# Patient Record
Sex: FEMALE | Race: WHITE | NOT HISPANIC OR LATINO | Employment: UNEMPLOYED | RURAL
[De-identification: names, ages, dates, MRNs, and addresses within clinical notes are randomized per-mention and may not be internally consistent; named-entity substitution may affect disease eponyms.]

---
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
## 2025-01-07 ENCOUNTER — TELEPHONE (OUTPATIENT)
Dept: FAMILY MEDICINE CLINIC | Facility: CLINIC | Age: 20
End: 2025-01-07
Payer: COMMERCIAL

## 2025-01-07 NOTE — TELEPHONE ENCOUNTER
HUB TO RELAY:     Attempted to call patient and reschedule appointment on 01/06/2025. Unable to reach patient. Hub okay to reschedule.

## 2025-01-29 ENCOUNTER — OFFICE VISIT (OUTPATIENT)
Dept: FAMILY MEDICINE CLINIC | Facility: CLINIC | Age: 20
End: 2025-01-29
Payer: COMMERCIAL

## 2025-01-29 VITALS
HEART RATE: 96 BPM | TEMPERATURE: 98.6 F | RESPIRATION RATE: 18 BRPM | OXYGEN SATURATION: 100 % | HEIGHT: 66 IN | WEIGHT: 136 LBS | SYSTOLIC BLOOD PRESSURE: 110 MMHG | BODY MASS INDEX: 21.86 KG/M2 | DIASTOLIC BLOOD PRESSURE: 60 MMHG

## 2025-01-29 DIAGNOSIS — Z00.00 ENCOUNTER FOR WELL ADULT EXAM WITHOUT ABNORMAL FINDINGS: Primary | ICD-10-CM

## 2025-01-29 DIAGNOSIS — Z13.1 SCREENING FOR DIABETES MELLITUS: ICD-10-CM

## 2025-01-29 DIAGNOSIS — N92.1 MENORRHAGIA WITH IRREGULAR CYCLE: ICD-10-CM

## 2025-01-29 DIAGNOSIS — Z13.6 ENCOUNTER FOR LIPID SCREENING FOR CARDIOVASCULAR DISEASE: ICD-10-CM

## 2025-01-29 DIAGNOSIS — Z13.220 ENCOUNTER FOR LIPID SCREENING FOR CARDIOVASCULAR DISEASE: ICD-10-CM

## 2025-01-29 DIAGNOSIS — Z30.41 ENCOUNTER FOR SURVEILLANCE OF CONTRACEPTIVE PILLS: ICD-10-CM

## 2025-01-29 DIAGNOSIS — E55.9 VITAMIN D DEFICIENCY: ICD-10-CM

## 2025-01-29 DIAGNOSIS — R53.83 FATIGUE, UNSPECIFIED TYPE: ICD-10-CM

## 2025-01-29 PROCEDURE — 99385 PREV VISIT NEW AGE 18-39: CPT | Performed by: PHYSICIAN ASSISTANT

## 2025-01-29 PROCEDURE — 2014F MENTAL STATUS ASSESS: CPT | Performed by: PHYSICIAN ASSISTANT

## 2025-01-29 RX ORDER — DROSPIRENONE AND ETHINYL ESTRADIOL 0.03MG-3MG
1 KIT ORAL DAILY
Qty: 84 TABLET | Refills: 3 | Status: SHIPPED | OUTPATIENT
Start: 2025-01-29

## 2025-01-29 NOTE — PROGRESS NOTES
Eddie Power is a 19 y.o. female.     History of Present Illness   History of Present Illness  The patient presents to [facility] care, for annual physical and for evaluation of irregular periods. She is accompanied by her mother.    She has been experiencing irregular menstrual cycles.  She initiated her first menstrual cycle at the age of 12, characterized by severe cramping that resulted in school absenteeism. Her menstrual flow typically lasts for 5 days, and she occasionally resorts to Midol or Tylenol for pain management. She reports no history of sexual activity. She discontinued her birth control regimen last month to observe her body's natural response but did not experience a regular menstrual cycle this month. She has been on birth control for approximately 2 years, during which she would procure 3 packs at a time. She has not undergone any thyroid function tests. Prior to discontinuing birth control, she maintained a regular monthly menstrual cycle. She has previously tried a lower dose birth control, which resulted in breakthrough bleeding, but her current birth control has been effective in regulating her menstrual cycle. She also reports fatigue, which she attributes to her academic commitments.    She has undergone laser treatment for hirsutism.    Supplemental Information  She had her wisdom teeth removed in August 2022 without any complications. She maintains good dental and eye health.    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    FAMILY HISTORY  Her father has diabetes. Her grandmother has thyroid concerns.    MEDICATIONS  Discontinued: Birth control       The following portions of the patient's history were reviewed and updated as appropriate: allergies, current medications, past family history, past medical history, past social history, past surgical history, and problem list.    Review of Systems  As noted per HPI  "    Objective   Blood pressure 110/60, pulse 96, temperature 98.6 °F (37 °C), resp. rate 18, height 166.4 cm (65.5\"), weight 61.7 kg (136 lb), SpO2 100%. Body mass index is 22.29 kg/m².     Physical Exam  Vitals reviewed.   Constitutional:       Appearance: Normal appearance.   HENT:      Head: Normocephalic.      Right Ear: Tympanic membrane, ear canal and external ear normal.      Left Ear: Tympanic membrane, ear canal and external ear normal.      Nose: Nose normal.      Mouth/Throat:      Pharynx: No oropharyngeal exudate or posterior oropharyngeal erythema.   Eyes:      Conjunctiva/sclera: Conjunctivae normal.   Cardiovascular:      Rate and Rhythm: Normal rate and regular rhythm.   Pulmonary:      Effort: Pulmonary effort is normal.      Breath sounds: Normal breath sounds.   Neurological:      Mental Status: She is alert and oriented to person, place, and time.   Psychiatric:         Mood and Affect: Mood normal.         Behavior: Behavior normal.         Results      Assessment & Plan   Assessment & Plan  1. Irregular menstrual cycles.  Her symptoms do not align with a diagnosis of polycystic ovarian syndrome (PCOS). The possibility of endometriosis was discussed, but it is unlikely given the absence of debilitating symptoms. She was advised to consider alternative contraceptive methods such as implants or intrauterine devices (IUDs). She was also informed about the potential benefits of hormonal therapy in managing endometriosis. She was advised to undergo a Pap smear in May 2025. A prescription refill for her birth control pills was provided, with the recommendation to commence the new pack on the first day of her next menstrual cycle. A comprehensive blood workup will be conducted today to assess her hormone levels, including testosterone, and vitamin levels. She was encouraged to establish an account on [employer] to facilitate access to her lab results and communication with the healthcare team.    2. " Hirsutism.  She has experienced excess hair growth on the chin area and has undergone laser treatment. Blood work will be conducted to check hormone levels, including testosterone, to determine if there is a hormonal imbalance contributing to the condition. If testosterone levels are high, non-hormonal treatment strategies will be considered.    3. Health Maintenance.  She was advised to undergo a Pap smear in May 2025 to screen for cervical cancer risk. Blood work will also include screening for diabetes due to a family history of the condition.    PROCEDURE  The patient had her wisdom teeth removed in August 2022.     Diagnoses and all orders for this visit:    1. Encounter for well adult exam without abnormal findings (Primary)  -     CBC w AUTO Differential  -     Comprehensive metabolic panel  -     Lipid Panel  -     Hemoglobin A1c  -     TSH  -     T4, free  -     Iron Profile  -     Vitamin B12  -     Folate  -     Vitamin D 25 hydroxy  -     FSH & LH  -     Estrogens, Total  -     Testosterone    2. Screening for diabetes mellitus  -     Hemoglobin A1c    3. Encounter for lipid screening for cardiovascular disease  -     Lipid Panel    4. Menorrhagia with irregular cycle  -     FSH & LH  -     Estrogens, Total  -     Testosterone    5. Fatigue, unspecified type  -     CBC w AUTO Differential  -     Comprehensive metabolic panel  -     TSH  -     T4, free  -     Iron Profile  -     Vitamin B12  -     Folate    6. Vitamin D deficiency  -     Vitamin D 25 hydroxy    7. Encounter for surveillance of contraceptive pills  -     drospirenone-ethinyl estradiol (Laura 28) 3-0.03 MG per tablet; Take 1 tablet by mouth Daily.  Dispense: 84 tablet; Refill: 3    Counseling was given to patient for the following topics: instructions for management, risk factor reductions, patient and family education, and impressions . Total time of the encounter was 15 minutes and 7.5 minutes was spent counseling.             Patient or  patient representative verbalized consent for the use of Ambient Listening during the visit with  CHERYL Dale for chart documentation. 1/29/2025  14:02 EST

## 2025-02-01 LAB
25(OH)D3+25(OH)D2 SERPL-MCNC: 9 NG/ML (ref 30–100)
ALBUMIN SERPL-MCNC: 4.6 G/DL (ref 4–5)
ALP SERPL-CCNC: 61 IU/L (ref 42–106)
ALT SERPL-CCNC: 17 IU/L (ref 0–32)
AST SERPL-CCNC: 15 IU/L (ref 0–40)
BASOPHILS # BLD AUTO: 0 X10E3/UL (ref 0–0.2)
BASOPHILS NFR BLD AUTO: 1 %
BILIRUB SERPL-MCNC: 0.4 MG/DL (ref 0–1.2)
BUN SERPL-MCNC: 8 MG/DL (ref 6–20)
BUN/CREAT SERPL: 19 (ref 9–23)
CALCIUM SERPL-MCNC: 9.5 MG/DL (ref 8.7–10.2)
CHLORIDE SERPL-SCNC: 106 MMOL/L (ref 96–106)
CHOLEST SERPL-MCNC: 148 MG/DL (ref 100–199)
CO2 SERPL-SCNC: 21 MMOL/L (ref 20–29)
CREAT SERPL-MCNC: 0.43 MG/DL (ref 0.57–1)
EGFRCR SERPLBLD CKD-EPI 2021: 143 ML/MIN/1.73
EOSINOPHIL # BLD AUTO: 0.3 X10E3/UL (ref 0–0.4)
EOSINOPHIL NFR BLD AUTO: 4 %
ERYTHROCYTE [DISTWIDTH] IN BLOOD BY AUTOMATED COUNT: 11.7 % (ref 11.7–15.4)
ESTROGEN SERPL-MCNC: 270 PG/ML
FOLATE SERPL-MCNC: 5.1 NG/ML
FSH SERPL-ACNC: 4.8 MIU/ML
GLOBULIN SER CALC-MCNC: 2.5 G/DL (ref 1.5–4.5)
GLUCOSE SERPL-MCNC: 82 MG/DL (ref 70–99)
HBA1C MFR BLD: 5.3 % (ref 4.8–5.6)
HCT VFR BLD AUTO: 37.6 % (ref 34–46.6)
HDLC SERPL-MCNC: 47 MG/DL
HGB BLD-MCNC: 12.6 G/DL (ref 11.1–15.9)
IMM GRANULOCYTES # BLD AUTO: 0 X10E3/UL (ref 0–0.1)
IMM GRANULOCYTES NFR BLD AUTO: 1 %
IRON SATN MFR SERPL: 38 % (ref 15–55)
IRON SERPL-MCNC: 137 UG/DL (ref 27–159)
LDLC SERPL CALC-MCNC: 68 MG/DL (ref 0–99)
LH SERPL-ACNC: 10.9 MIU/ML
LYMPHOCYTES # BLD AUTO: 2.1 X10E3/UL (ref 0.7–3.1)
LYMPHOCYTES NFR BLD AUTO: 33 %
MCH RBC QN AUTO: 29.9 PG (ref 26.6–33)
MCHC RBC AUTO-ENTMCNC: 33.5 G/DL (ref 31.5–35.7)
MCV RBC AUTO: 89 FL (ref 79–97)
MONOCYTES # BLD AUTO: 0.5 X10E3/UL (ref 0.1–0.9)
MONOCYTES NFR BLD AUTO: 8 %
NEUTROPHILS # BLD AUTO: 3.6 X10E3/UL (ref 1.4–7)
NEUTROPHILS NFR BLD AUTO: 53 %
PLATELET # BLD AUTO: 303 X10E3/UL (ref 150–450)
POTASSIUM SERPL-SCNC: 4.3 MMOL/L (ref 3.5–5.2)
PROT SERPL-MCNC: 7.1 G/DL (ref 6–8.5)
RBC # BLD AUTO: 4.22 X10E6/UL (ref 3.77–5.28)
SODIUM SERPL-SCNC: 140 MMOL/L (ref 134–144)
T4 FREE SERPL-MCNC: 1.13 NG/DL (ref 0.82–1.77)
TESTOST SERPL-MCNC: 45 NG/DL (ref 13–71)
TIBC SERPL-MCNC: 357 UG/DL (ref 250–450)
TRIGL SERPL-MCNC: 198 MG/DL (ref 0–149)
TSH SERPL DL<=0.005 MIU/L-ACNC: 1.46 UIU/ML (ref 0.45–4.5)
UIBC SERPL-MCNC: 220 UG/DL (ref 131–425)
VIT B12 SERPL-MCNC: 290 PG/ML (ref 232–1245)
VLDLC SERPL CALC-MCNC: 33 MG/DL (ref 5–40)
WBC # BLD AUTO: 6.6 X10E3/UL (ref 3.4–10.8)

## 2025-02-03 DIAGNOSIS — E55.9 VITAMIN D DEFICIENCY: Primary | ICD-10-CM

## 2025-02-03 RX ORDER — ERGOCALCIFEROL 1.25 MG/1
50000 CAPSULE, LIQUID FILLED ORAL WEEKLY
Qty: 5 CAPSULE | Refills: 5 | Status: SHIPPED | OUTPATIENT
Start: 2025-02-03

## 2025-03-26 ENCOUNTER — PATIENT MESSAGE (OUTPATIENT)
Dept: FAMILY MEDICINE CLINIC | Facility: CLINIC | Age: 20
End: 2025-03-26
Payer: COMMERCIAL

## 2025-03-27 ENCOUNTER — TELEPHONE (OUTPATIENT)
Dept: FAMILY MEDICINE CLINIC | Facility: CLINIC | Age: 20
End: 2025-03-27
Payer: COMMERCIAL

## 2025-03-27 NOTE — TELEPHONE ENCOUNTER
Per my chart    Hi Dr. Calvo,     I hope you’re doing well! I wanted to send you a message and talk about my period dates annd how my acne is after being off of birth control.      My period has came naturally every month except January (because it started on the last day of January) which is great! Also, each period has last up to 5 days.     I wanted to tell you that after being off of birth control, I don’t feel bloated (which is a good thing), but my acne has started to show up again. My face was pretty clear when I was on it for a year.      Therefore, I wanted to ask should I start being back on birth control for my acne, or should I find another way to treat my acne? I have a dermatologist who prescribed me Tretinoin and a benzoyl perioxide face wash, and it’s not working as much as it used to when I was on birth control. I also tried using a hyaluronic acid serum as well. Please let me know what would be the best option to treat my acne.     Thank You,  Zuleyma Power

## 2025-03-27 NOTE — TELEPHONE ENCOUNTER
I would recommend trying Differin Gel OTC and if still not improving we can do a trial of spironolactone that is more specific for acne. Does not need to go back on birth control if she is feeling better off of it.

## 2025-05-13 ENCOUNTER — TELEPHONE (OUTPATIENT)
Dept: FAMILY MEDICINE CLINIC | Facility: CLINIC | Age: 20
End: 2025-05-13
Payer: COMMERCIAL

## 2025-05-13 NOTE — TELEPHONE ENCOUNTER
Caller: Zuleyma Power    Relationship: Self    Best call back number: [phone]     What was the call regarding: PATIENT WANTS FRAN BANKS TO RECOMMEND A DENTIST THAT TAKES [employer] INSURANCE     Is it okay if the provider responds through MyChart:

## 2025-05-15 NOTE — TELEPHONE ENCOUNTER
Spoke with pt she is aware we do not know who takes this insurance and she might want to speak with her insurance co